# Patient Record
Sex: MALE | Race: WHITE | URBAN - METROPOLITAN AREA
[De-identification: names, ages, dates, MRNs, and addresses within clinical notes are randomized per-mention and may not be internally consistent; named-entity substitution may affect disease eponyms.]

---

## 2022-09-07 ENCOUNTER — OFFICE VISIT (OUTPATIENT)
Dept: URBAN - METROPOLITAN AREA CLINIC 44 | Facility: CLINIC | Age: 67
End: 2022-09-07
Payer: MEDICAID

## 2022-09-07 DIAGNOSIS — H25.813 COMBINED FORMS OF AGE-RELATED CATARACT, BILATERAL: Primary | ICD-10-CM

## 2022-09-07 DIAGNOSIS — H04.123 TEAR FILM INSUFFICIENCY OF BILATERAL LACRIMAL GLANDS: ICD-10-CM

## 2022-09-07 DIAGNOSIS — H43.393 OTHER VITREOUS OPACITIES, BILATERAL: ICD-10-CM

## 2022-09-07 DIAGNOSIS — H52.13 MYOPIA, BILATERAL: ICD-10-CM

## 2022-09-07 PROCEDURE — 92004 COMPRE OPH EXAM NEW PT 1/>: CPT | Performed by: OPTOMETRIST

## 2022-09-07 ASSESSMENT — INTRAOCULAR PRESSURE
OD: 13
OS: 10

## 2022-09-07 ASSESSMENT — VISUAL ACUITY
OS: 20/25
OD: 20/40

## 2022-09-07 ASSESSMENT — KERATOMETRY
OS: 45.00
OD: 45.25

## 2022-09-07 NOTE — IMPRESSION/PLAN
Impression: Myopia, bilateral: H52.13. Difficult to refract due to poor positioning Plan: PLAN: Updated RX

## 2022-09-07 NOTE — IMPRESSION/PLAN
Impression: Other vitreous opacities, bilateral: H43.393. Reports no new floaters, flashes  or veiling. No retinal defects noted. Patient reports occasional floaters which are not interfering with daily activities and vision. Plan: PLAN: Discussed S/S of RD/RT. Stressed importance to RTC immediately if S/S progress.

## 2022-09-07 NOTE — IMPRESSION/PLAN
Impression: Combined forms of age-related cataract, bilateral: H25.813. Patient asymptomatic and comfortable with current level of vision. Plan: PLAN: Continue to observe condition. RTC 12 months for complete exam complete + OCT (Mac) to reaccessed cataracts. RTC sooner if patient symptoms become worse.